# Patient Record
Sex: MALE | Race: WHITE | HISPANIC OR LATINO | URBAN - METROPOLITAN AREA
[De-identification: names, ages, dates, MRNs, and addresses within clinical notes are randomized per-mention and may not be internally consistent; named-entity substitution may affect disease eponyms.]

---

## 2024-04-02 ENCOUNTER — APPOINTMENT (RX ONLY)
Dept: URBAN - METROPOLITAN AREA CLINIC 15 | Facility: CLINIC | Age: 29
Setting detail: DERMATOLOGY
End: 2024-04-02

## 2024-04-02 VITALS — HEIGHT: 68 IN | WEIGHT: 190 LBS

## 2024-04-02 DIAGNOSIS — D49.2 NEOPLASM OF UNSPECIFIED BEHAVIOR OF BONE, SOFT TISSUE, AND SKIN: ICD-10-CM

## 2024-04-02 PROCEDURE — ? COUNSELING

## 2024-04-02 PROCEDURE — ? PRESCRIPTION

## 2024-04-02 PROCEDURE — 11102 TANGNTL BX SKIN SINGLE LES: CPT

## 2024-04-02 PROCEDURE — ? BIOPSY BY SHAVE METHOD

## 2024-04-02 PROCEDURE — 99203 OFFICE O/P NEW LOW 30 MIN: CPT | Mod: 25

## 2024-04-02 PROCEDURE — ? PRESCRIPTION MEDICATION MANAGEMENT

## 2024-04-02 RX ORDER — MUPIROCIN 20 MG/G
1 OINTMENT TOPICAL BID
Qty: 15 | Refills: 0 | Status: ERX | COMMUNITY
Start: 2024-04-02

## 2024-04-02 RX ADMIN — MUPIROCIN 1: 20 OINTMENT TOPICAL at 00:00

## 2024-04-02 ASSESSMENT — LOCATION DETAILED DESCRIPTION DERM: LOCATION DETAILED: LEFT LATERAL DISTAL CALF

## 2024-04-02 ASSESSMENT — LOCATION SIMPLE DESCRIPTION DERM: LOCATION SIMPLE: LEFT LOWER LEG

## 2024-04-02 ASSESSMENT — LOCATION ZONE DERM: LOCATION ZONE: LEG

## 2024-04-02 NOTE — PROCEDURE: BIOPSY BY SHAVE METHOD
Detail Level: Detailed
Depth Of Biopsy: dermis
Was A Bandage Applied: Yes
Size Of Lesion In Cm: 0
Biopsy Type: H and E
Biopsy Method: Dermablade
Anesthesia Type: 1% lidocaine with epinephrine
Anesthesia Volume In Cc: 0.8
Hemostasis: Electrocautery
Wound Care: Polysporin ointment
Dressing: bandage
Destruction After The Procedure: No
Type Of Destruction Used: Curettage
Curettage Text: The wound bed was treated with curettage after the biopsy was performed.
Cryotherapy Text: The wound bed was treated with cryotherapy after the biopsy was performed.
Electrodesiccation Text: The wound bed was treated with electrodesiccation after the biopsy was performed.
Electrodesiccation And Curettage Text: The wound bed was treated with electrodesiccation and curettage after the biopsy was performed.
Silver Nitrate Text: The wound bed was treated with silver nitrate after the biopsy was performed.
Lab: [id number]
Lab Facility: [id number]
Consent: Written consent was obtained and risks were reviewed including but not limited to scarring, infection, bleeding, scabbing, incomplete removal, nerve damage and allergy to anesthesia.
Post-Care Instructions: I reviewed with the patient in detail post-care instructions. Patient is to keep the biopsy site dry overnight, and then apply bacitracin twice daily until healed. Patient may apply hydrogen peroxide soaks to remove any crusting.
Notification Instructions: Patient will be notified of biopsy results. However, patient instructed to call the office if not contacted within 2 weeks.
Billing Type: Third-Party Bill
Information: Selecting Yes will display possible errors in your note based on the variables you have selected. This validation is only offered as a suggestion for you. PLEASE NOTE THAT THE VALIDATION TEXT WILL BE REMOVED WHEN YOU FINALIZE YOUR NOTE. IF YOU WANT TO FAX A PRELIMINARY NOTE YOU WILL NEED TO TOGGLE THIS TO 'NO' IF YOU DO NOT WANT IT IN YOUR FAXED NOTE.

## 2024-04-02 NOTE — PROCEDURE: PRESCRIPTION MEDICATION MANAGEMENT
Initiate Treatment: .\\n\\n- mupirocin 2 % topical ointment - Apply a small amount to the biopsy site on the left lower leg area twice a day for 14 days.
Render In Strict Bullet Format?: No
Detail Level: Zone

## 2024-05-21 ENCOUNTER — APPOINTMENT (RX ONLY)
Dept: URBAN - METROPOLITAN AREA CLINIC 15 | Facility: CLINIC | Age: 29
Setting detail: DERMATOLOGY
End: 2024-05-21

## 2024-05-21 DIAGNOSIS — B07.8 OTHER VIRAL WARTS: ICD-10-CM | Status: RESOLVED

## 2024-05-21 DIAGNOSIS — L81.0 POSTINFLAMMATORY HYPERPIGMENTATION: ICD-10-CM

## 2024-05-21 PROCEDURE — ? DIAGNOSIS COMMENT

## 2024-05-21 PROCEDURE — ? PRESCRIPTION

## 2024-05-21 PROCEDURE — ? PRESCRIPTION MEDICATION MANAGEMENT

## 2024-05-21 PROCEDURE — ? COUNSELING

## 2024-05-21 PROCEDURE — 99213 OFFICE O/P EST LOW 20 MIN: CPT

## 2024-05-21 PROCEDURE — ? RECOMMENDATIONS

## 2024-05-21 RX ORDER — TACROLIMUS 1 MG/G
1 OINTMENT TOPICAL BID
Qty: 100 | Refills: 0 | Status: ERX | COMMUNITY
Start: 2024-05-21

## 2024-05-21 RX ORDER — TRIAMCINOLONE ACETONIDE 1 MG/G
1 CREAM TOPICAL BID
Qty: 30 | Refills: 0 | Status: ERX | COMMUNITY
Start: 2024-05-21

## 2024-05-21 RX ADMIN — TACROLIMUS 1: 1 OINTMENT TOPICAL at 00:00

## 2024-05-21 RX ADMIN — TRIAMCINOLONE ACETONIDE 1: 1 CREAM TOPICAL at 00:00

## 2024-05-21 ASSESSMENT — LOCATION ZONE DERM
LOCATION ZONE: LEG
LOCATION ZONE: LEG
LOCATION ZONE: HAND

## 2024-05-21 ASSESSMENT — LOCATION SIMPLE DESCRIPTION DERM
LOCATION SIMPLE: LEFT LOWER LEG
LOCATION SIMPLE: LEFT LOWER LEG
LOCATION SIMPLE: LEFT HAND

## 2024-05-21 ASSESSMENT — LOCATION DETAILED DESCRIPTION DERM
LOCATION DETAILED: LEFT LATERAL DISTAL CALF
LOCATION DETAILED: LEFT LATERAL DISTAL CALF
LOCATION DETAILED: 1ST WEB SPACE LEFT HAND

## 2024-05-21 ASSESSMENT — BSA RASH: BSA RASH: 1

## 2024-05-21 ASSESSMENT — SEVERITY ASSESSMENT: SEVERITY: MILD

## 2024-05-21 NOTE — PROCEDURE: RECOMMENDATIONS
Detail Level: Zone
Recommendations (Free Text): .\\n\\n- Silagen gel apply to the scar twice daily. \\n- Apply Mineral SPF 30-50 the to left posterior leg daily ( ingredients: Zinc Oxide, Titanium Dioxide). \\n\\n.
Render Risk Assessment In Note?: no
Recommendation Preamble: The following recommendations were made during the visit:

## 2024-05-21 NOTE — PROCEDURE: PRESCRIPTION MEDICATION MANAGEMENT
Initiate Treatment: .\\n\\nStart the first 2 weeks the following treatment:\\n- Triamcinolone acetonide 0.1 % topical cream Apply to affected areas on the right and left hands twice daily for two weeks.\\n- Apply La Roche Posay Mineral SPF 30-50 the to face, neck, hands daily ( ingredients: Zinc Oxide, Titanium Dioxide). \\n\\n\\nStart after Triamcinolone the following treatment: \\n- Tacrolimus 0.1 % topical ointment Apply to affected areas on right and left hand 2 times a day for 4 weeks.\\n- Apply La Roche Posay Mineral SPF 30-50 the to face, neck, hands daily ( ingredients: Zinc Oxide, Titanium Dioxide). \\n\\n\\n.
Detail Level: Zone
Render In Strict Bullet Format?: No

## 2024-05-21 NOTE — HPI: SKIN LESION
What Type Of Note Output Would You Prefer (Optional)?: Bullet Format
How Severe Is Your Skin Lesion?: mild
Has Your Skin Lesion Been Treated?: not been treated
Is This A New Presentation, Or A Follow-Up?: Skin Lesions
Additional History: Patient presents lesions on hands onset 2 months without treatment.

## 2024-05-21 NOTE — PROCEDURE: DIAGNOSIS COMMENT
Comment: Belén proven 04/02/2024 accession #[id number]
Detail Level: Simple
Render Risk Assessment In Note?: no

## 2024-07-22 ENCOUNTER — APPOINTMENT (RX ONLY)
Dept: URBAN - METROPOLITAN AREA CLINIC 15 | Facility: CLINIC | Age: 29
Setting detail: DERMATOLOGY
End: 2024-07-22

## 2024-07-22 VITALS — WEIGHT: 190 LBS | HEIGHT: 68 IN

## 2024-07-22 DIAGNOSIS — L81.0 POSTINFLAMMATORY HYPERPIGMENTATION: ICD-10-CM | Status: RESOLVING

## 2024-07-22 PROCEDURE — ? COUNSELING

## 2024-07-22 PROCEDURE — ? PRESCRIPTION MEDICATION MANAGEMENT

## 2024-07-22 PROCEDURE — 99212 OFFICE O/P EST SF 10 MIN: CPT

## 2024-07-22 ASSESSMENT — SEVERITY ASSESSMENT: SEVERITY: ALMOST CLEAR

## 2024-07-22 ASSESSMENT — BSA RASH: BSA RASH: 1

## 2024-07-22 ASSESSMENT — LOCATION DETAILED DESCRIPTION DERM: LOCATION DETAILED: 1ST WEB SPACE LEFT HAND

## 2024-07-22 ASSESSMENT — LOCATION ZONE DERM: LOCATION ZONE: HAND

## 2024-07-22 ASSESSMENT — LOCATION SIMPLE DESCRIPTION DERM: LOCATION SIMPLE: LEFT HAND

## 2024-07-22 NOTE — PROCEDURE: PRESCRIPTION MEDICATION MANAGEMENT
Continue Regimen: .\\n\\nMay continue the following instructions for 2 more weeks. \\n\\n- Tacrolimus 0.1 % topical ointment Apply to affected areas on right and left hand 2 times a day for 2 week.\\n\\nThen:\\nApply sunscreen on affected areas**\\n- Apply La Roche Posay Mineral SPF 30-50 the to face, neck, hands daily ( ingredients: Zinc Oxide, Titanium Dioxide). \\n\\n\\n.
Detail Level: Zone
Render In Strict Bullet Format?: No